# Patient Record
Sex: FEMALE | Race: OTHER | Employment: UNEMPLOYED | URBAN - METROPOLITAN AREA
[De-identification: names, ages, dates, MRNs, and addresses within clinical notes are randomized per-mention and may not be internally consistent; named-entity substitution may affect disease eponyms.]

---

## 2021-07-17 ENCOUNTER — HOSPITAL ENCOUNTER (EMERGENCY)
Age: 61
Discharge: HOME | End: 2021-07-17

## 2021-07-17 VITALS
RESPIRATION RATE: 20 BRPM | SYSTOLIC BLOOD PRESSURE: 188 MMHG | OXYGEN SATURATION: 98 % | DIASTOLIC BLOOD PRESSURE: 101 MMHG | TEMPERATURE: 98.8 F | HEART RATE: 74 BPM

## 2021-07-17 DIAGNOSIS — I10 ESSENTIAL HYPERTENSION: ICD-10-CM

## 2021-07-17 DIAGNOSIS — L02.91 ABSCESS: Primary | ICD-10-CM

## 2021-07-17 PROCEDURE — 2500000003 HC RX 250 WO HCPCS: Performed by: PHYSICIAN ASSISTANT

## 2021-07-17 PROCEDURE — 10080 I&D PILONIDAL CYST SIMPLE: CPT

## 2021-07-17 PROCEDURE — 6370000000 HC RX 637 (ALT 250 FOR IP): Performed by: PHYSICIAN ASSISTANT

## 2021-07-17 PROCEDURE — 99284 EMERGENCY DEPT VISIT MOD MDM: CPT

## 2021-07-17 RX ORDER — LIDOCAINE HYDROCHLORIDE AND EPINEPHRINE BITARTRATE 20; .01 MG/ML; MG/ML
20 INJECTION, SOLUTION SUBCUTANEOUS ONCE
Status: COMPLETED | OUTPATIENT
Start: 2021-07-17 | End: 2021-07-17

## 2021-07-17 RX ORDER — SULFAMETHOXAZOLE AND TRIMETHOPRIM 800; 160 MG/1; MG/1
1 TABLET ORAL ONCE
Status: COMPLETED | OUTPATIENT
Start: 2021-07-17 | End: 2021-07-17

## 2021-07-17 RX ORDER — CEPHALEXIN 250 MG/1
500 CAPSULE ORAL ONCE
Status: COMPLETED | OUTPATIENT
Start: 2021-07-17 | End: 2021-07-17

## 2021-07-17 RX ORDER — SULFAMETHOXAZOLE AND TRIMETHOPRIM 800; 160 MG/1; MG/1
1 TABLET ORAL 2 TIMES DAILY
Qty: 14 TABLET | Refills: 0 | Status: SHIPPED | OUTPATIENT
Start: 2021-07-17 | End: 2021-07-24

## 2021-07-17 RX ORDER — CEPHALEXIN 500 MG/1
500 CAPSULE ORAL 4 TIMES DAILY
Qty: 28 CAPSULE | Refills: 0 | Status: SHIPPED | OUTPATIENT
Start: 2021-07-17 | End: 2021-07-24

## 2021-07-17 RX ADMIN — CEPHALEXIN 500 MG: 250 CAPSULE ORAL at 21:31

## 2021-07-17 RX ADMIN — SULFAMETHOXAZOLE AND TRIMETHOPRIM 1 TABLET: 800; 160 TABLET ORAL at 21:31

## 2021-07-17 RX ADMIN — LIDOCAINE HYDROCHLORIDE,EPINEPHRINE BITARTRATE 20 ML: 20; .01 INJECTION, SOLUTION INFILTRATION; PERINEURAL at 19:54

## 2021-07-17 ASSESSMENT — PAIN SCALES - GENERAL: PAINLEVEL_OUTOF10: 5

## 2021-07-18 NOTE — ED PROVIDER NOTES
[facility] [address] EMERGENCY DEPARTMENT    Patient: Gloria Byrd  MRN: [id number]  : 1960  Date of Evaluation: 2021  ED Provider: Marcella Resendez PA-C    Chief Complaint       Chief Complaint   Patient presents with    Breast Problem     left breast infection, wound for a long time, drainage from wound        Chaitanya Gong is a 63 y.o. female who presents to the emergency department for a wound to the left breast.  Patient is Marana, history obtained using  #[id number]. She reports being bit by something while at work 2 days ago. She has now developed an area of swelling along the left upper medial aspect of the breast.  She states the area is painful, particularly to touch. Denies any drainage. Denies any fever or chills. She states she is not diabetic. Patient noted to be hypertensive. She states she has a history of hypertension but she is not on any medications. Denies headache, dizziness, chest pain, sob, n/v/d.        ROS     CONSTITUTIONAL:  Denies fever. RESPIRATORY:  Denies any shortness of breath. CARDIOVASCULAR:  Denies chest pain. GI:  Denies nausea or vomiting. INTEGUMENT:  + abscess. Past History     Past Medical History:   Diagnosis Date    Hypertension      History reviewed. No pertinent surgical history.   Social History     Socioeconomic History    Marital status: Single     Spouse name: None    Number of children: None    Years of education: None    Highest education level: None   Occupational History    None   Tobacco Use    Smoking status: Never Smoker    Smokeless tobacco: Never Used   Substance and Sexual Activity    Alcohol use: Not Currently    Drug use: Not Currently    Sexual activity: None   Other Topics Concern    None   Social History Narrative    None     Social Determinants of Health     Financial Resource Strain:     Difficulty of Paying Living Expenses: Food Insecurity:     Worried About Running Out of Food in the Last Year:     [address] in the Last Year:    Transportation Needs:     Lack of Transportation (Medical):  Lack of Transportation (Non-Medical):    Physical Activity:     Days of Exercise per Week:     Minutes of Exercise per Session:    Stress:     Feeling of Stress :    Social Connections:     Frequency of Communication with Friends and Family:     Frequency of Social Gatherings with Friends and Family:     Attends Judaism Services:     Active Member of Clubs or Organizations:     Attends Club or Organization Meetings:     Marital Status:    Intimate Partner Violence:     Fear of Current or Ex-Partner:     Emotionally Abused:     Physically Abused:     Sexually Abused:        Medications/Allergies     Previous Medications    No medications on file     No Known Allergies     Physical Exam       ED Triage Vitals [07/17/21 1723]   BP Temp Temp Source Pulse Resp SpO2 Height Weight   (S) (!) 200/95 98.8 °F (37.1 °C) Oral 78 16 99 % -- --     GENERAL APPEARANCE:  Well-developed, well-nourished, no acute distress. HEAD:  NC/AT. EYES:  Sclera anicteric. ENT:  Ears, nose, mouth normal.     NECK:  Supple. CARDIO:  RRR. LUNGS:   CTAB. Respirations unlabored. ABDOMEN:  Soft, non-distended, non-tender. BS active. EXTREMITIES:  No acute deformities. SKIN:  Warm and dry. There is a 2 cm diameter fluctuant, tender abscess to the left upper medial breast, no surrounding induration or erythema. No active drainage. NEUROLOGICAL:  Alert and oriented. PSYCHIATRIC:  Normal mood. Procedures/EKG:   Patient Name: Bobbi Phillips   Medical Record Number: [id number]  Date: 7/17/2021   Time: 9:13 PM   Room/Bed: ED15/ED-15    Incision and Drainage Procedure Note    Indication: Abscess to left breast    Procedure: The patient was positioned appropriately and the skin over the incision site was prepped with betadine.   Local anesthesia was achieved using 3 cc of 2% lidocaine with epi. An incision was then made and purulent material was expressed. Loculations were broken up using a curved hemostat. The drainage cavity was then irrigated with high pressure NS and a dressing was applied per nursing. The patient tolerated the procedure well. No immediate complications. ED Course and MDM   -  Patient seen and evaluated in the emergency department. -  Triage and nursing notes reviewed and incorporated. -  Old chart records reviewed and incorporated. -  Supervising physician was Dr. Cooper Tanner. Patient was seen independently. -  ED medications: Bactrim, Keflex  -  I&D performed as noted above. -  Will dc home with Bactrim and Keflex. Advised on warm compresses to the site. She was strongly encouraged to FU with Primary Care, both for a wound check and for management of her hypertension. She is asymptomatic in the ED but advised that her BP is significantly elevated. Return here as needed. She is agreeable with plan of care and disposition.  -  Disposition:  Home    In light of current events, I did utilize appropriate PPE (including N95 and surgical face mask, safety glasses, and gloves, as recommended by the health facility/national standard best practice, during my bedside interactions with the patient. Final Impression      1. Abscess    2.  Essential hypertension          DISPOSITION Decision To Discharge 07/17/2021 08:38:39 PM      [address], [address] [place], [place]  07/17/21 2484